# Patient Record
Sex: FEMALE | Race: WHITE | NOT HISPANIC OR LATINO | Employment: UNEMPLOYED | RURAL
[De-identification: names, ages, dates, MRNs, and addresses within clinical notes are randomized per-mention and may not be internally consistent; named-entity substitution may affect disease eponyms.]

---

## 2017-08-08 VITALS — HEIGHT: 67 IN

## 2017-08-14 RX ORDER — DIVALPROEX SODIUM 500 MG/1
1000 TABLET, DELAYED RELEASE ORAL
COMMUNITY
End: 2019-12-02

## 2017-08-16 ENCOUNTER — OFFICE VISIT (OUTPATIENT)
Dept: CARDIOLOGY | Facility: CLINIC | Age: 32
End: 2017-08-16

## 2017-08-16 VITALS
DIASTOLIC BLOOD PRESSURE: 78 MMHG | SYSTOLIC BLOOD PRESSURE: 116 MMHG | HEIGHT: 65 IN | WEIGHT: 181.6 LBS | HEART RATE: 67 BPM | BODY MASS INDEX: 30.26 KG/M2

## 2017-08-16 DIAGNOSIS — R20.2 PARESTHESIA AND PAIN OF LEFT EXTREMITY: ICD-10-CM

## 2017-08-16 DIAGNOSIS — R06.02 SHORTNESS OF BREATH: Primary | ICD-10-CM

## 2017-08-16 DIAGNOSIS — M79.609 PARESTHESIA AND PAIN OF LEFT EXTREMITY: ICD-10-CM

## 2017-08-16 DIAGNOSIS — M79.605 PAIN OF LEFT LOWER EXTREMITY: ICD-10-CM

## 2017-08-16 PROCEDURE — 99204 OFFICE O/P NEW MOD 45 MIN: CPT | Performed by: INTERNAL MEDICINE

## 2017-08-16 PROCEDURE — 93000 ELECTROCARDIOGRAM COMPLETE: CPT | Performed by: INTERNAL MEDICINE

## 2017-08-17 NOTE — PROGRESS NOTES
"Date of Office Visit: 2017  Encounter Provider: Eddie Hardwick MD  Place of Service: [facility]  Patient Name: Gillian Rojas  :1985    Chief complaint: Shortness of breath, left sided pain.    History of Present Illness:    Dear Janet:    I had the pleasure of seeing your patient in cardiology office on 2017.  As you   well know, she is a very pleasant 32 year-old white female with tobacco use and   asthma who presents for evaluation.  The patient states approximately 2 months   ago, she developed pain over the entire left side of her body.  She states that it   started in the face and head area, and spread to the arm and leg.  She also felt   pain over the left chest precordium.  She still has left arm paresthesias, and still   has left leg pain as well.  She is scheduled to see neurology on 2017, but   has not had a formal neurological work-up at this point.  She has not had any   exertional chest discomfort.  She is more short of breath with exertion and more   fatigued.  She does state that she can only do minimal activity without becoming   short of breath over the last several months.  She has smoked for approximately   20 years, and is currently on one pack of cigarettes per day.    Past Medical History:   Diagnosis Date   • Asthma     \"Seasonal\"   • Depression    • Frequent UTI    • HSV-2 (herpes simplex virus 2) infection    • Vitamin D deficiency        Past Surgical History:   Procedure Laterality Date   • CERVICAL BIOPSY  W/ LOOP ELECTRODE EXCISION     • COLPOSCOPY     • CYSTOSCOPY     • TUBAL ABDOMINAL LIGATION         No current outpatient prescriptions on file prior to visit.     No current facility-administered medications on file prior to visit.      Allergies as of 2017 - Eric as Reviewed 2017   Allergen Reaction Noted   • Latex  2017     Social History     Social History   • Marital status: Single     Spouse " "name: N/A   • Number of children: N/A   • Years of education: N/A     Occupational History   • Not on file.     Social History Main Topics   • Smoking status: Current Every Day Smoker     Packs/day: 1.00     Types: Cigarettes   • Smokeless tobacco: Never Used      Comment: Smoked up to 1 ppd for 20 years    • Alcohol use No   • Drug use: No   • Sexual activity: Not on file     Other Topics Concern   • Not on file     Social History Narrative     Family History   Problem Relation Age of Onset   • Brain cancer Mother    • Lung cancer Mother    • Thyroid cancer Mother    • Lung cancer Maternal Grandmother    • Esophageal cancer Maternal Grandmother    • Diabetes Maternal Grandfather    • Stroke Maternal Grandfather    • Aneurysm Maternal Grandfather    • Coronary artery disease Maternal Grandfather       from MI   • Coronary artery disease Paternal Grandmother      MI   • Coronary artery disease Paternal Grandfather       from MI        Review of Systems   Constitution: Positive for malaise/fatigue.   Eyes: Positive for blurred vision.   Cardiovascular: Positive for dyspnea on exertion and leg swelling.   Respiratory: Positive for wheezing.    Endocrine: Positive for cold intolerance and heat intolerance.   Skin: Positive for itching.   Musculoskeletal: Positive for joint pain, joint swelling and myalgias.   Neurological: Positive for excessive daytime sleepiness, numbness and paresthesias.   Psychiatric/Behavioral: Positive for depression. The patient is nervous/anxious.    All other systems reviewed and are negative.    Objective:     Vitals:    17 1122   BP: 116/78   Pulse: 67   Weight: 181 lb 9.6 oz (82.4 kg)   Height: 65\" (165.1 cm)     Body mass index is 30.22 kg/(m^2).    Physical Exam   Constitutional: She is oriented to person, place, and time. She appears well-developed and well-nourished.   HENT:   Head: Normocephalic and atraumatic.   Eyes: Conjunctivae are normal.   Neck: Neck supple. "   Cardiovascular: Normal rate and regular rhythm.  Exam reveals no gallop and no friction rub.    No murmur heard.  Pulmonary/Chest: Effort normal and breath sounds normal.   Abdominal: Soft. There is no tenderness.   Musculoskeletal: She exhibits no edema.   Neurological: She is alert and oriented to person, place, and time.   Skin: Skin is warm.   Psychiatric: She has a normal mood and affect. Her behavior is normal.     Lab Review:     ECG 12 Lead  Date/Time: 8/16/2017 11:25 AM  Performed by: AMIE MOTTA  Authorized by: AMIE MOTTA   Comparison: compared with previous ECG from 8/8/2017  Similar to previous ECG  Rhythm: sinus rhythm  Rate: normal  BPM: 67  Clinical impression: normal ECG          Assessment:       Diagnosis Plan   1. Shortness of breath  Adult Transthoracic Echo Complete   2. Paresthesia and pain of left extremity  Adult Transthoracic Echo Complete   3. Pain of left lower extremity       Plan:       The patient has had general left sided pain in her head, chest, arm, and leg.  The pain in   her leg persists, and she still has paresthesias in her arm per her own account.  She has   not had exertional pain, although she has had more dyspnea on exertion recently.  She is   scheduled to see neurology on 9/11/2017, and I completely agree with a neurological   work-up.  She has smoked for approximately 20 years, and this may be contributing to   some of the shortness of breath.  For now, I am going to order an echocardiogram with   bubble study to assess for any potential shunt given the neurological symptoms, as well   as for any potential structural heart disease given the dyspnea.  She may also want to   start taking an aspirin 81 mg per day until her neurological work-up is completed.  Further   plans will be made pending the results the echocardiogram.

## 2020-08-26 PROBLEM — Z20.822 EXPOSURE TO COVID-19 VIRUS: Status: ACTIVE | Noted: 2020-08-26

## 2020-08-28 ENCOUNTER — TELEPHONE (OUTPATIENT)
Dept: URGENT CARE | Facility: CLINIC | Age: 35
End: 2020-08-28